# Patient Record
Sex: FEMALE | Race: BLACK OR AFRICAN AMERICAN | NOT HISPANIC OR LATINO | Employment: FULL TIME | URBAN - METROPOLITAN AREA
[De-identification: names, ages, dates, MRNs, and addresses within clinical notes are randomized per-mention and may not be internally consistent; named-entity substitution may affect disease eponyms.]

---

## 2023-09-15 ENCOUNTER — PATIENT OUTREACH (OUTPATIENT)
Dept: FAMILY MEDICINE CLINIC | Facility: CLINIC | Age: 28
End: 2023-09-15

## 2023-09-15 ENCOUNTER — OFFICE VISIT (OUTPATIENT)
Dept: FAMILY MEDICINE CLINIC | Facility: CLINIC | Age: 28
End: 2023-09-15
Payer: COMMERCIAL

## 2023-09-15 ENCOUNTER — APPOINTMENT (OUTPATIENT)
Dept: LAB | Facility: CLINIC | Age: 28
End: 2023-09-15
Payer: COMMERCIAL

## 2023-09-15 VITALS
SYSTOLIC BLOOD PRESSURE: 110 MMHG | WEIGHT: 165.8 LBS | TEMPERATURE: 98.1 F | OXYGEN SATURATION: 98 % | DIASTOLIC BLOOD PRESSURE: 72 MMHG | HEIGHT: 63 IN | HEART RATE: 79 BPM | BODY MASS INDEX: 29.38 KG/M2

## 2023-09-15 DIAGNOSIS — Z11.59 NEED FOR HEPATITIS C SCREENING TEST: ICD-10-CM

## 2023-09-15 DIAGNOSIS — Z11.4 SCREENING FOR HIV (HUMAN IMMUNODEFICIENCY VIRUS): ICD-10-CM

## 2023-09-15 DIAGNOSIS — E66.3 OVERWEIGHT: ICD-10-CM

## 2023-09-15 DIAGNOSIS — Z59.819 HOUSING INSECURITY: ICD-10-CM

## 2023-09-15 DIAGNOSIS — Z59.819 HOUSING INSECURITY: Primary | ICD-10-CM

## 2023-09-15 DIAGNOSIS — Z59.41 FOOD INSECURITY: ICD-10-CM

## 2023-09-15 DIAGNOSIS — Z00.00 ANNUAL PHYSICAL EXAM: Primary | ICD-10-CM

## 2023-09-15 DIAGNOSIS — R10.9 ABDOMINAL WALL PAIN: ICD-10-CM

## 2023-09-15 LAB
25(OH)D3 SERPL-MCNC: 19.7 NG/ML (ref 30–100)
BASOPHILS # BLD AUTO: 0.03 THOUSANDS/ÂΜL (ref 0–0.1)
BASOPHILS NFR BLD AUTO: 0 % (ref 0–1)
EOSINOPHIL # BLD AUTO: 0.04 THOUSAND/ÂΜL (ref 0–0.61)
EOSINOPHIL NFR BLD AUTO: 1 % (ref 0–6)
ERYTHROCYTE [DISTWIDTH] IN BLOOD BY AUTOMATED COUNT: 14.8 % (ref 11.6–15.1)
HCT VFR BLD AUTO: 39.8 % (ref 34.8–46.1)
HGB BLD-MCNC: 13.1 G/DL (ref 11.5–15.4)
IMM GRANULOCYTES # BLD AUTO: 0.02 THOUSAND/UL (ref 0–0.2)
IMM GRANULOCYTES NFR BLD AUTO: 0 % (ref 0–2)
LYMPHOCYTES # BLD AUTO: 2.49 THOUSANDS/ÂΜL (ref 0.6–4.47)
LYMPHOCYTES NFR BLD AUTO: 31 % (ref 14–44)
MCH RBC QN AUTO: 31.4 PG (ref 26.8–34.3)
MCHC RBC AUTO-ENTMCNC: 32.9 G/DL (ref 31.4–37.4)
MCV RBC AUTO: 95 FL (ref 82–98)
MONOCYTES # BLD AUTO: 0.35 THOUSAND/ÂΜL (ref 0.17–1.22)
MONOCYTES NFR BLD AUTO: 4 % (ref 4–12)
NEUTROPHILS # BLD AUTO: 5.1 THOUSANDS/ÂΜL (ref 1.85–7.62)
NEUTS SEG NFR BLD AUTO: 64 % (ref 43–75)
NRBC BLD AUTO-RTO: 0 /100 WBCS
PLATELET # BLD AUTO: 316 THOUSANDS/UL (ref 149–390)
PMV BLD AUTO: 10.1 FL (ref 8.9–12.7)
RBC # BLD AUTO: 4.17 MILLION/UL (ref 3.81–5.12)
TSH SERPL DL<=0.05 MIU/L-ACNC: 0.57 UIU/ML (ref 0.45–4.5)
VIT B12 SERPL-MCNC: 249 PG/ML (ref 180–914)
WBC # BLD AUTO: 8.03 THOUSAND/UL (ref 4.31–10.16)

## 2023-09-15 PROCEDURE — 86803 HEPATITIS C AB TEST: CPT

## 2023-09-15 PROCEDURE — 82306 VITAMIN D 25 HYDROXY: CPT

## 2023-09-15 PROCEDURE — 82607 VITAMIN B-12: CPT

## 2023-09-15 PROCEDURE — 87389 HIV-1 AG W/HIV-1&-2 AB AG IA: CPT

## 2023-09-15 PROCEDURE — 85025 COMPLETE CBC W/AUTO DIFF WBC: CPT

## 2023-09-15 PROCEDURE — 36415 COLL VENOUS BLD VENIPUNCTURE: CPT

## 2023-09-15 PROCEDURE — 99385 PREV VISIT NEW AGE 18-39: CPT | Performed by: FAMILY MEDICINE

## 2023-09-15 PROCEDURE — 84443 ASSAY THYROID STIM HORMONE: CPT

## 2023-09-15 RX ORDER — NAPROXEN SODIUM 220 MG
220 TABLET ORAL 2 TIMES DAILY PRN
Qty: 30 TABLET | Refills: 0 | Status: SHIPPED | OUTPATIENT
Start: 2023-09-15

## 2023-09-15 RX ORDER — CITALOPRAM HYDROBROMIDE 10 MG/1
10 TABLET ORAL DAILY
Qty: 30 TABLET | Refills: 2 | Status: SHIPPED | OUTPATIENT
Start: 2023-09-15

## 2023-09-15 SDOH — ECONOMIC STABILITY - HOUSING INSECURITY: HOUSING INSTABILITY UNSPECIFIED: Z59.819

## 2023-09-15 SDOH — ECONOMIC STABILITY - FOOD INSECURITY: FOOD INSECURITY: Z59.41

## 2023-09-15 NOTE — PATIENT INSTRUCTIONS
Please get your blood work completed before our next visit   Call a few locations for therapy and set up an appointment for within 1-4 weeks   Start Celexa 10 mg daily for depression

## 2023-09-15 NOTE — PROGRESS NOTES
Name: Antonio Espinal      : 1995      MRN: [id number]  Encounter Provider: Deisy Pelayo MD  Encounter Date: 9/15/2023   Encounter department: [address] [place] [address]     1. Annual physical exam  Comments: All screenings reviewed, in need of pap smear. will discuss at later visit    2. Post partum depression  Assessment & Plan:  [place] screen  2 months postpartum  Daughter is what brings her ag, otherwise finds herself crying daily, and has little interest in things that used to bring her interest.  Open to behavioral health therapy, social work went into her room after visit to discuss options  We will start Celexa 10 mg daily, and increase as tolerated. Advised close follow-up initially  Side effects and risks to medication discussed, is appropriate for breast-feeding as benefits outweigh the risk. Denies any suicidal ideation, self-harm, homicidal ideation, or thoughts of harm to baby. Orders:  -     TSH, 3rd generation with Free T4 reflex; Future  -     CBC and differential; Future  -     Vitamin B12; Future  -     Vitamin D 25 hydroxy; Future  -     citalopram (CeleXA) 10 mg tablet; Take 1 tablet (10 mg total) by mouth daily    3. Housing insecurity  -     Ambulatory Referral to [address] [place] [address]; Future    4. Food insecurity  -     Ambulatory Referral to Social Work Care Management Program; Future    5. Need for hepatitis C screening test  -     Hepatitis C Antibody; Future    6. Screening for HIV (human immunodeficiency virus)  -     HIV 1/2 AG/AB w Reflex [facility] for 2 yr old and above; Future    7. Overweight  -     Lipid Panel with Direct LDL reflex; Future  -     Comprehensive metabolic panel; Future    8. Abdominal wall pain  Comments:  2/2  recovery, appears well healed and no pain on palpation. Reports intermittent tightness/pain. Advised nsaids prn, can continue using her binder.    Orders:  -     naproxen sodium (ALEVE) 220 MG tablet; Take 1 tablet (220 mg total) by mouth 2 (two) times a day as needed for mild pain Take with food         Subjective      This is a very pleasant 34 y.o. female who presents to the clinic as a new patient to establish care. She has no known past medical history and takes no medications. She is concerned for post partum depression. Is about 2 months post partum of her second child delivered full term via c section. She is not sleeping well and has no appetite. She has lack of interest in things previously bringing her pleasure. 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 Sister helps with . She has a four year old son that lives with her as well. She is living with her sister as she recently lost her housing in [place] where her daughter was born. This was due to separation from daughters father and financially not able to afford housing on her own. She does have trouble with access to food as well. Tearful during visit and feels overwhelmed. Patient's medical conditions are stable unless noted otherwise above. Patient has not had any recent hospitalizations, or medical emergencies since last visit. Patient has no further complaints other than what is mentioned in the ROS. PHQ-2/9 Depression Screening    Little interest or pleasure in doing things: 2 - more than half the days  Feeling down, depressed, or hopeless: 0 - not at all  Trouble falling or staying asleep, or sleeping too much: 0 - not at all  Feeling tired or having little energy: 0 - not at all  Poor appetite or overeatin - several days  Feeling bad about yourself - or that you are a failure or have let yourself or your family down: 1 - several days  Trouble concentrating on things, such as reading the newspaper or watching television: 0 - not at all  Moving or speaking so slowly that other people could have noticed.  Or the opposite - being so fidgety or restless that you have been moving around a lot more than usual: 0 - not at all  Thoughts that you would be better off dead, or of hurting yourself in some way: 0 - not at all  PHQ-2 Score: 2  PHQ-2 Interpretation: Negative depression screen  PHQ-9 Score: 4   PHQ-9 Interpretation: No or Minimal depression        SUREKHA-7 Flowsheet Screening    Flowsheet Row Most Recent Value   Over the last 2 weeks, how often have you been bothered by any of the following problems? Feeling nervous, anxious, or on edge 1   Not being able to stop or control worrying 1   Worrying too much about different things 2   Trouble relaxing 1   Being so restless that it is hard to sit still 0   Becoming easily annoyed or irritable 0   Feeling afraid as if something awful might happen 0   SUREKHA-7 Total Score 5        [place] Depression Scale Score is 17/30     Review of Systems   Constitutional: Positive for fatigue. Negative for activity change, appetite change, chills and fever. HENT: Negative for congestion, dental problem, rhinorrhea, sinus pain and sore throat. Eyes: Negative for visual disturbance. Respiratory: Negative for cough, chest tightness, shortness of breath and wheezing. Cardiovascular: Negative for chest pain, palpitations and leg swelling. Gastrointestinal: Positive for abdominal pain. Negative for abdominal distention, anal bleeding, blood in stool, constipation, diarrhea, nausea and vomiting. Genitourinary: Negative for difficulty urinating, dysuria, frequency, hematuria and urgency. Musculoskeletal: Negative for arthralgias, back pain, myalgias and neck pain. Skin: Negative for rash. Allergic/Immunologic: Negative. Neurological: Negative for dizziness, weakness, light-headedness, numbness and headaches. Hematological: Negative for adenopathy. Psychiatric/Behavioral: Positive for dysphoric mood and sleep disturbance.  Negative for agitation, behavioral problems, confusion, decreased concentration, self-injury and suicidal ideas. The patient is nervous/anxious. No current outpatient medications on file prior to visit. Objective     /72 (BP Location: Left arm, Patient Position: Sitting, Cuff Size: Adult)   Pulse 79   Temp 98.1 °F (36.7 °C) (Tympanic)   Ht 5' 3" (1.6 m)   Wt 75.2 kg (165 lb 12.8 oz)   SpO2 98%   BMI 29.37 kg/m²     Physical Exam  Vitals and nursing note reviewed. Constitutional:       Appearance: She is well-developed. HENT:      Head: Normocephalic and atraumatic. Eyes:      Pupils: Pupils are equal, round, and reactive to light. Cardiovascular:      Rate and Rhythm: Normal rate and regular rhythm. Heart sounds: Normal heart sounds. No murmur heard. Pulmonary:      Effort: Pulmonary effort is normal. No respiratory distress. Breath sounds: Normal breath sounds. No wheezing. Chest:      Chest wall: No tenderness. Abdominal:      General: Bowel sounds are normal. There is no distension. Palpations: Abdomen is soft. Tenderness: There is no abdominal tenderness. Comments: Well healed low transverse scar from delivery   Musculoskeletal:         General: No tenderness. Normal range of motion. Cervical back: Normal range of motion and neck supple. Skin:     General: Skin is warm and dry. Capillary Refill: Capillary refill takes less than 2 seconds. Neurological:      Mental Status: She is alert and oriented to person, place, and time. Psychiatric:         Attention and Perception: Attention normal.         Mood and Affect: Mood is depressed. Mood is not anxious. Affect is tearful. Affect is not labile or flat. Speech: Speech normal.         Behavior: Behavior normal. Behavior is cooperative. Thought Content: Thought content normal. Thought content is not paranoid or delusional. Thought content does not include homicidal or suicidal ideation. Thought content does not include homicidal or suicidal plan. Bry Coker MD

## 2023-09-15 NOTE — PROGRESS NOTES
[facility] had received an in person referral from Idalmis Hernandez MD r/t PPD. Per provider, patient experiencing PPD with second child. Per provider, started patient on Celexa 10 mg for PPD. Per provider, patient recently moved to the area and lives with sister. [facility] had completed a chart review. Per chart, patient also referred for housing and food insecurity. [facility] had met with the patient after office visit. [facility] introduced herself and reason for consult. [facility] asked patient how she was doing. Patient stated she is doing well. [facility] educated patient on PPD. Patient stated she has dx of anxiety and depression. Patient stated she is interested in therapy but not psychiatry. [facility] had suggested [facility] [phone] as they offer therapy r/t her PPD, anxiety and depression. Patient given brochure for them. Patient stated she will establish care. Patient stated she lives with sister for the time being. Patient stated her sister is her main support. Patient stated it is her, her 3 y/o son and 8 week baby girl. Patient stated her son's father lives in [place] and calls to check in. Patient stated her daughter's father is not in the picture. Patient stated she is starting work in 3 weeks. Patient stated she will be working at [employer]. Patient did not report any transportation issues at this time. Patient stated she does need help with WIC and SNAP. [facility] offered to have her [facility] help with housing programs, rental assistance and SNAP application. Patient agreed. [facility] discussed [address] assistance through "[employer]. Patient asked [facility] to email her at [email or web address] and [email or web address]. [facility] agreed to do so. Patient stated she needed to get going as she was picking up her son at [address] [facility] provided her contact information. [facility] advised patient reach out if she has any other needs. Patient agreed. Patient consented to continued  outreach.  [facility] added patient to report under socially complex. [facility] sent email on [facility] assistance through [address]. [facility] also included the following r/t Formula Savings Program:    [employer]  [employer] Rewards  https://Itouzi.com/IncentOne    Enfamil  Family Beginnings Program  https://[email or web address]®.MyWants/    [facility] sent an in basket to [facility] about this case. [facility] will continue to f/u.

## 2023-09-17 LAB
HCV AB SER QL: NORMAL
HIV 1+2 AB+HIV1 P24 AG SERPL QL IA: NORMAL
HIV 2 AB SERPL QL IA: NORMAL
HIV1 AB SERPL QL IA: NORMAL
HIV1 P24 AG SERPL QL IA: NORMAL

## 2023-09-18 ENCOUNTER — APPOINTMENT (OUTPATIENT)
Dept: LAB | Facility: CLINIC | Age: 28
End: 2023-09-18
Payer: COMMERCIAL

## 2023-09-18 LAB
ALBUMIN SERPL BCP-MCNC: 4.1 G/DL (ref 3.5–5)
ALP SERPL-CCNC: 67 U/L (ref 34–104)
ALT SERPL W P-5'-P-CCNC: 32 U/L (ref 7–52)
ANION GAP SERPL CALCULATED.3IONS-SCNC: 8 MMOL/L
AST SERPL W P-5'-P-CCNC: 22 U/L (ref 13–39)
BILIRUB SERPL-MCNC: 0.45 MG/DL (ref 0.2–1)
BUN SERPL-MCNC: 9 MG/DL (ref 5–25)
CALCIUM SERPL-MCNC: 9.1 MG/DL (ref 8.4–10.2)
CHLORIDE SERPL-SCNC: 103 MMOL/L (ref 96–108)
CHOLEST SERPL-MCNC: 158 MG/DL
CO2 SERPL-SCNC: 27 MMOL/L (ref 21–32)
CREAT SERPL-MCNC: 0.81 MG/DL (ref 0.6–1.3)
GFR SERPL CREATININE-BSD FRML MDRD: 100 ML/MIN/1.73SQ M
GLUCOSE P FAST SERPL-MCNC: 86 MG/DL (ref 65–99)
HDLC SERPL-MCNC: 50 MG/DL
LDLC SERPL CALC-MCNC: 94 MG/DL (ref 0–100)
POTASSIUM SERPL-SCNC: 4.3 MMOL/L (ref 3.5–5.3)
PROT SERPL-MCNC: 7.3 G/DL (ref 6.4–8.4)
SODIUM SERPL-SCNC: 138 MMOL/L (ref 135–147)
TRIGL SERPL-MCNC: 70 MG/DL

## 2023-09-18 PROCEDURE — 80061 LIPID PANEL: CPT

## 2023-09-18 PROCEDURE — 80053 COMPREHEN METABOLIC PANEL: CPT

## 2023-09-18 PROCEDURE — 36415 COLL VENOUS BLD VENIPUNCTURE: CPT

## 2023-09-19 ENCOUNTER — ANNUAL EXAM (OUTPATIENT)
Dept: FAMILY MEDICINE CLINIC | Facility: CLINIC | Age: 28
End: 2023-09-19
Payer: COMMERCIAL

## 2023-09-19 ENCOUNTER — PATIENT OUTREACH (OUTPATIENT)
Dept: FAMILY MEDICINE CLINIC | Facility: CLINIC | Age: 28
End: 2023-09-19

## 2023-09-19 VITALS
BODY MASS INDEX: 28.9 KG/M2 | WEIGHT: 163.1 LBS | SYSTOLIC BLOOD PRESSURE: 126 MMHG | HEART RATE: 69 BPM | RESPIRATION RATE: 16 BRPM | DIASTOLIC BLOOD PRESSURE: 66 MMHG | OXYGEN SATURATION: 99 % | HEIGHT: 63 IN

## 2023-09-19 DIAGNOSIS — Z12.4 CERVICAL CANCER SCREENING: ICD-10-CM

## 2023-09-19 DIAGNOSIS — N89.8 VAGINAL DISCHARGE: Primary | ICD-10-CM

## 2023-09-19 DIAGNOSIS — Z98.891 PREVIOUS CESAREAN SECTION: ICD-10-CM

## 2023-09-19 DIAGNOSIS — E55.9 VITAMIN D DEFICIENCY: ICD-10-CM

## 2023-09-19 PROBLEM — Z01.419 WOMEN'S ANNUAL ROUTINE GYNECOLOGICAL EXAMINATION: Status: RESOLVED | Noted: 2023-09-19 | Resolved: 2023-09-19

## 2023-09-19 PROBLEM — Z01.419 WOMEN'S ANNUAL ROUTINE GYNECOLOGICAL EXAMINATION: Status: ACTIVE | Noted: 2023-09-19

## 2023-09-19 PROCEDURE — 87591 N.GONORRHOEAE DNA AMP PROB: CPT | Performed by: FAMILY MEDICINE

## 2023-09-19 PROCEDURE — G0145 SCR C/V CYTO,THINLAYER,RESCR: HCPCS | Performed by: FAMILY MEDICINE

## 2023-09-19 PROCEDURE — 87510 GARDNER VAG DNA DIR PROBE: CPT | Performed by: FAMILY MEDICINE

## 2023-09-19 PROCEDURE — 87660 TRICHOMONAS VAGIN DIR PROBE: CPT | Performed by: FAMILY MEDICINE

## 2023-09-19 PROCEDURE — 87480 CANDIDA DNA DIR PROBE: CPT | Performed by: FAMILY MEDICINE

## 2023-09-19 PROCEDURE — 87491 CHLMYD TRACH DNA AMP PROBE: CPT | Performed by: FAMILY MEDICINE

## 2023-09-19 PROCEDURE — 99214 OFFICE O/P EST MOD 30 MIN: CPT | Performed by: FAMILY MEDICINE

## 2023-09-19 RX ORDER — MELATONIN
1000 DAILY
Qty: 30 TABLET | Refills: 6 | Status: SHIPPED | OUTPATIENT
Start: 2023-09-19

## 2023-09-19 RX ORDER — METRONIDAZOLE 500 MG/1
500 TABLET ORAL EVERY 12 HOURS SCHEDULED
Qty: 14 TABLET | Refills: 0 | Status: SHIPPED | OUTPATIENT
Start: 2023-09-19 | End: 2023-09-26

## 2023-09-19 NOTE — PROGRESS NOTES
[address] [place] [address] called pt and left a message regarding assisting pt with getting snap benefits and housing. [address] [place] [address] then sent pt an email introducing self and asked if pt could give [address] [place] [address] a return call tomorrow.     Will F/U by 9/21
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
## 2023-09-19 NOTE — ASSESSMENT & PLAN NOTE
Advise to do modified exercises in gym rather than regular core exercise prior to pernancy as she is still only 2 mo pp  Encouraged to look up exercises for core that are safe for post partum mothers specifically who had c section

## 2023-09-19 NOTE — ASSESSMENT & PLAN NOTE
Discussed with patient that the USPSTF recommends screening for cervical cancer every 3 years with cervical cytology alone in women aged 24 to 34 years. Patient has agreed to undergo testing at this time. All questions were answered. STD screening discussed, HIV/Hep C negative   GC/Chlamydia ordered   Vaginosis probe ordered   Does not meet criteria for breast cancer screening at this time    Pap performed, patient tolerated well. Advised that she may experience some spotting afterwards which is normal and expected. Will call with results.

## 2023-09-21 ENCOUNTER — PATIENT OUTREACH (OUTPATIENT)
Dept: FAMILY MEDICINE CLINIC | Facility: CLINIC | Age: 28
End: 2023-09-21

## 2023-09-21 LAB
C TRACH DNA SPEC QL NAA+PROBE: NEGATIVE
CANDIDA RRNA VAG QL PROBE: NEGATIVE
G VAGINALIS RRNA GENITAL QL PROBE: NEGATIVE
N GONORRHOEA DNA SPEC QL NAA+PROBE: NEGATIVE
T VAGINALIS RRNA GENITAL QL PROBE: NEGATIVE

## 2023-09-21 NOTE — PROGRESS NOTES
[facility] emailed pt that [facility] had attempted to contact pt last week to see if pt was still interested in getting assistance with housing and  rental assistance? [facility] communicated , [facility] will have Ron send pt an application to complete for housing. Please feel free to give [facility] a call. [facility] will contact pt within a week to see if she received the housing application. and sent a send application request to pt home address through [employer] ([employer])     Will F/U by 9/28

## 2023-09-22 ENCOUNTER — PATIENT OUTREACH (OUTPATIENT)
Dept: FAMILY MEDICINE CLINIC | Facility: CLINIC | Age: 28
End: 2023-09-22

## 2023-09-22 NOTE — PROGRESS NOTES
ALEXX had called the patient via phone. ALEXX left a voicemail. KONRAD will attempt to call again at a later date and time. ALEXX routed note to [facility]. ALEXX will continue to f/u.

## 2023-09-23 LAB
LAB AP GYN PRIMARY INTERPRETATION: NORMAL
Lab: NORMAL

## 2023-09-28 ENCOUNTER — PATIENT OUTREACH (OUTPATIENT)
Dept: FAMILY MEDICINE CLINIC | Facility: CLINIC | Age: 28
End: 2023-09-28

## 2023-09-28 NOTE — LETTER
09/28/23    Dear Doris Sanchez,    I am a [facility] Worker with [address] [address]. I have made several attempts to call you by phone. It is important that you contact me back at [phone], so that I can assist with your care needs.      Sincerely,       GOPAL RecinosEd

## 2023-09-28 NOTE — PROGRESS NOTES
[address] [address] left pt a message to return call regarding if pt received [facility] application and sent pt unable to reach letter via pt [employer]. Will F/U within 2 weeks for pt correspondence.

## 2023-09-29 ENCOUNTER — PATIENT OUTREACH (OUTPATIENT)
Dept: FAMILY MEDICINE CLINIC | Facility: CLINIC | Age: 28
End: 2023-09-29

## 2023-09-29 NOTE — PROGRESS NOTES
ALEXX had called the patient via phone. ALEXX left a voicemail. KONRAD notes this is the second phone call attempt. [facility] notes Bartolo Choi has not been able to get in contact with this patient. [facility] will be mailing out an unable to reach letter today. KONRAD will await 2 weeks before closing the case. ALEXX routed note to [place]. ALEXX will continue to f/u.

## 2023-09-29 NOTE — LETTER
[address] [address]    Re: Bee Kerwin to reach   9/29/2023       Dear Kai Reyes,    I tried to reach you by phone and was unfortunately unable to reach you. It is important you be a part of your plan of care.  If you are still in need of assistance with services, please give me a call back at [phone] from 8am-4:30pm.    Sincerely,         LANA Richardson  Outpatient Care Manager -

## 2023-10-12 ENCOUNTER — PATIENT OUTREACH (OUTPATIENT)
Dept: FAMILY MEDICINE CLINIC | Facility: CLINIC | Age: 28
End: 2023-10-12

## 2023-10-12 ENCOUNTER — OFFICE VISIT (OUTPATIENT)
Dept: FAMILY MEDICINE CLINIC | Facility: CLINIC | Age: 28
End: 2023-10-12
Payer: COMMERCIAL

## 2023-10-12 VITALS
TEMPERATURE: 98.3 F | DIASTOLIC BLOOD PRESSURE: 64 MMHG | HEIGHT: 63 IN | SYSTOLIC BLOOD PRESSURE: 108 MMHG | BODY MASS INDEX: 28.7 KG/M2 | RESPIRATION RATE: 21 BRPM | OXYGEN SATURATION: 98 % | HEART RATE: 88 BPM | WEIGHT: 162 LBS

## 2023-10-12 DIAGNOSIS — Z30.9 ENCOUNTER FOR CONTRACEPTIVE MANAGEMENT, UNSPECIFIED TYPE: ICD-10-CM

## 2023-10-12 DIAGNOSIS — R10.2 RIGHT ADNEXAL TENDERNESS: ICD-10-CM

## 2023-10-12 PROCEDURE — 99213 OFFICE O/P EST LOW 20 MIN: CPT | Performed by: FAMILY MEDICINE

## 2023-10-12 RX ORDER — CITALOPRAM 20 MG/1
20 TABLET ORAL DAILY
Start: 2023-10-12

## 2023-10-12 NOTE — ASSESSMENT & PLAN NOTE
Noted on deep palpation in setting of  3 months ago  F/u transabdominal and transvaginal ultrasound on next appt

## 2023-10-12 NOTE — ASSESSMENT & PLAN NOTE
Per previous discussion with patient's GYN, she would like to get an IUD. Patient plans to transfer her GYN provider from [place] to [facility].   RTO for birth control counseling

## 2023-10-12 NOTE — PROGRESS NOTES
[facility] Office visit    Assessment/Plan:     1. Post partum depression  Assessment & Plan:  [place] score 15 today  3 months postpartum  Denied suicidal homicidal ideation or plans. Denies thoughts about hurting or killing her baby. Open to behavioral health therapy. Provided list of mental health providers in the area. Referral to social work sent. Increased Celexa to 20 mg at therapeutic dose daily starting today, plan to titrate up if mood is not controlled. Side effects and risks to medication discussed, is appropriate for breast-feeding as benefits outweigh the risk. Orders:  -     citalopram (CeleXA) 20 mg tablet; Take 1 tablet (20 mg total) by mouth daily  -     Ambulatory Referral to Social Work Care Management Program; Future    2. Right adnexal tenderness  Assessment & Plan:  Noted on deep palpation in setting of  3 months ago  F/u transabdominal and transvaginal ultrasound on next appt    Orders:  -     US abdomen and pelvis with transvaginal; Future; Expected date: 10/12/2023    3. Encounter for contraceptive management, unspecified type  Assessment & Plan:  Per previous discussion with patient's GYN, she would like to get an IUD. Patient plans to transfer her GYN provider from [place] to [facility]. RTO for birth control counseling         [facility] here . Subjective:   CHERYLE Mondragon is a 34 y.o. female presents today for follow-up postpartum depression. She was started on Celexa 10 mg last month, but she still feels that her mood is uncontrolled. Continuing to report poor appetite, sleep disturbances, and feeling depressed. She continues to breast-feed. 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 Her commute is about an hour each way. Patient reported feeling down after her first pregnancy, but this is the first time she has ever been on a mood stabilizer.   She is open to therapy. Patient reports lower abdominal tenderness that is worse with palpation. She had  3 months ago with her OB/GYN in [place]. LMP was last week. Patient states she is not getting menses every month for 2 months. No intermenstrual bleeding. Denies fever or chills. Patient is interested in an IUD. She was previously on Nexplanon for 3 years but reported having body odor as side effect. Review of Systems   Constitutional:  Negative for chills and fever. HENT:  Negative for ear pain and sore throat. Eyes:  Negative for pain and visual disturbance. Respiratory:  Negative for cough and shortness of breath. Cardiovascular:  Negative for chest pain and palpitations. Gastrointestinal:  Positive for abdominal pain. Negative for vomiting. Genitourinary:  Negative for dysuria and hematuria. Musculoskeletal:  Negative for arthralgias and back pain. Skin:  Negative for color change and rash. Neurological:  Negative for seizures and syncope. Psychiatric/Behavioral:  Positive for dysphoric mood. All other systems reviewed and are negative. Objective:     /64 (BP Location: Left arm, Patient Position: Sitting, Cuff Size: Large)   Pulse 88   Temp 98.3 °F (36.8 °C) (Temporal)   Resp 21   Ht 5' 3" (1.6 m)   Wt 73.5 kg (162 lb)   LMP 10/02/2023 (Approximate)   SpO2 98%   Breastfeeding Yes   BMI 28.70 kg/m²      Physical Exam  Vitals reviewed. Constitutional:       General: She is not in acute distress. Appearance: Normal appearance. HENT:      Head: Normocephalic and atraumatic. Eyes:      Pupils: Pupils are equal, round, and reactive to light. Cardiovascular:      Rate and Rhythm: Normal rate and regular rhythm. Pulses: Normal pulses. Heart sounds: Normal heart sounds. No murmur heard. Pulmonary:      Effort: Pulmonary effort is normal. No respiratory distress. Breath sounds: Normal breath sounds. No wheezing.    Abdominal: General: Bowel sounds are normal.      Palpations: Abdomen is soft. Tenderness: There is abdominal tenderness (lower abd, worse in right adenexal region). There is no guarding or rebound. Skin:     General: Skin is warm and dry. Comments: C section scar healed well   Neurological:      General: No focal deficit present. Mental Status: She is alert and oriented to person, place, and time.    Psychiatric:         Mood and Affect: Mood normal.         Behavior: Behavior normal.          ** Please Note: This note has been constructed using a voice recognition system **     [address] Suresh MD  10/12/23  6:31 PM

## 2023-10-12 NOTE — PATIENT INSTRUCTIONS
Outpatient [facility] Suicide and Crisis [id number] (call or text)   Crisis Text Line Text HOME to [phone] or Message on Pop[employer] ([address])   [place] [phone]   [address] [place] [address] [phone] (call or text)   Jeremiah Munoz [phone]     [facility] for Counseling and Psychotherapy Services  Address: [address] [facility] 101 [facility], [facility]  Phone: [phone]; Children and adults of all ages for counseling; In-person and/or telehealth; Accepts Medicare and out of network benefits    [address]  Address: [address] [facility] [address] [facility], [address] [place] [address] 64 [facility]  Phone: [phone]  [place]; Ages 13+ for psychotherapy, medication management and evaluation; Accepts Cigna, Horizon BC BS, Humana Inc, [employer], Aetna, and [employer] [facility]  Address: [address] [place] [address], [address] [facility], [address] [place] [address]  Phone: [phone]  English; Ages 5+ for individual, family, and couples counseling; In-person and/or telehealth services; Accepts Horizon BCBS, [place], Yolis [employer]itan, and [employer]  Address: [address] [place], [place], [address]  Phone: [phone]  Bilingual; Ages 4+ for family, individual, play therapy, psychiatric evaluations, and medication monitoring; Accepts Medicaid and Medicare    [facility]  Address: [facility], [address] [facility]  Phone:  [phone]  Bilingual; Ages 4+ for counseling and 18+ for psychiatry and med management; [address] for [facility] [employer] [facility]  Address: [address] [place] [address]  Phone: [phone]  Bilingual; Ages 5+ for counseling and psychiatry; Accept Medicare, Medicaid, and some commercial insurances  [facility]  Various locations  Phone: [phone] Ext.  [phone]  Bilingual; Ages 3+ for counseling, psychiatry, and medication management; Accepts ONLY Medicaid    [facility]  Address: [address]  Phone: [phone]; Ages 6+ for counseling; Accepts Medicare, Medicaid, and some commercial insurances      [facility]  Address: [address] [facility] [address], Wilian [place], [address] [facility]  Phone: [phone]  Bilingual; Ages 3+ for counseling; In-person and/or telehealth; Accept [facility] Lyudmila, [employer], [employer]ines, Optum, [facility], [place], Seven [place], [place] and self-pay    LANA Souza [facility]  Address: [address] [facility] [address]  Phone: [phone]  English; Ages 18+ for counseling; Accepts Medicare, Medicaid, and some commercial insurances    [address]  Address: [address] [facility], [address] [place] [address]  Phone: [phone]  English; Ages 18+ for counseling; Accepts Medicare, Medicaid, and some commercial insurances    LANA Calderon, [facility]  Address: [address]  Phone: [phone]  [address] [place] [address] Street; Ages 10+ for counseling; Accepts Medicare, Medicaid, and some [address]  Address: [address] [facility], [address] [place] [address],[address] [address]  Phone: [phone]  English; Ages 6+ for counseling, psychiatry, and medication management; ONLY telehealth; Accepts Medicaid, Amerigroup, Rudine Rides, [facility], and Omeroe Loren (Medication Management Only)    [facility]  Address: [address], [address] [place] [address]  Phone: [phone]  English; Ages 5+ for psychiatric evaluation and medication management; Accepts self-pay, Cigna, [employer] (HMO & PPO), Most BC/BS plans, Saint augustine, Two [facility] and [address] [facility]  Address: [address]  Phone: [phone]  English; Ages 10+ for counseling;  Accepts Medicare and some [address] [facility] 1600 Psychotherapy  Address: [address] [place] [address] [address]  Phone: [phone]  Bilingual; Ages 3+ for counseling, psychiatry, and medication management; Accepts Medicaid, Medicare, and [address] [place] [address]  Address: [address]  Phone: [phone]  Bilingual; Ages 5+ for counseling and psychiatry; Accepts Medicaid insurance; Offer medication management    [address] [facility]  Address: [address] [facility] [address]  Phone: [phone]  English; Children and adults of all ages for counseling, psychiatric evaluation and medication management; Accepts Medicare and some commercial plans    [facility]  Address: [address]  Intake Number: [phone]  Phone: [phone]  Bilingual; Ages 5+ for counseling, psychiatry, and medication management; Does NOT accept Medicaid; Offers therapy, psychiatry, and medication management    [facility]  Address: [facility], [address] [place] [address]  Phone: [phone]  Bilingual; Only ages 11 to 24 for behavioral health, intellectual/developmental disability, or substance use; Accepts Medicaid, Medicare, and some commercial plans    [facility])  Address: [address]  Phone: [phone]  English; Ages 2-20 children for counseling; Accepts ONLY Medicaid    [address] [facility]  Address: [address] [place] [address]  Phone: [phone]  English; Ages 4+ for counseling; Accepts Medicaid and some commercial insurances    For any additional questions or concerns, please contact the [address] [place] [address] or the office for further assistance.   [facility],  [facility] [phone]

## 2023-10-12 NOTE — PROGRESS NOTES
Ayde Hermosillo sent a inbasket to [address] [place] [address] that Ayde Hermosillo will be closing pt for [facility] health services since there has been no response to unable to reach letter from 9/28 by Ayde Hermosillo. Ayde Hermosillo reviewd pt chart and saw that [facility] had sent pt an unable to reach letter as well on 9/29.

## 2023-10-12 NOTE — ASSESSMENT & PLAN NOTE
[place] score 15 today  3 months postpartum  Denied suicidal homicidal ideation or plans. Denies thoughts about hurting or killing her baby. Open to behavioral health therapy. Provided list of mental health providers in the area. Referral to social work sent. Increased Celexa to 20 mg at therapeutic dose daily starting today, plan to titrate up if mood is not controlled. Side effects and risks to medication discussed, is appropriate for breast-feeding as benefits outweigh the risk.

## 2023-10-13 ENCOUNTER — PATIENT OUTREACH (OUTPATIENT)
Dept: FAMILY MEDICINE CLINIC | Facility: CLINIC | Age: 28
End: 2023-10-13

## 2023-10-13 NOTE — PROGRESS NOTES
[facility] had completed a chart review. [facility] notes patient has not called back since sending out unable to reach letter. [facility] notes Jessica Bonilla closed case yesterday due to lack of communication from patient. [facility] will do the same today. Patient has resources to f/u on her own. Please reconsult.

## 2023-11-18 PROBLEM — Z12.4 CERVICAL CANCER SCREENING: Status: RESOLVED | Noted: 2023-09-19 | Resolved: 2023-11-18

## 2024-01-25 ENCOUNTER — OFFICE VISIT (OUTPATIENT)
Dept: FAMILY MEDICINE CLINIC | Facility: CLINIC | Age: 29
End: 2024-01-25
Payer: COMMERCIAL

## 2024-01-25 VITALS
HEIGHT: 63 IN | WEIGHT: 153 LBS | SYSTOLIC BLOOD PRESSURE: 188 MMHG | RESPIRATION RATE: 18 BRPM | HEART RATE: 76 BPM | TEMPERATURE: 98.6 F | DIASTOLIC BLOOD PRESSURE: 70 MMHG | OXYGEN SATURATION: 99 % | BODY MASS INDEX: 27.11 KG/M2

## 2024-01-25 DIAGNOSIS — Z30.09 BIRTH CONTROL COUNSELING: Primary | ICD-10-CM

## 2024-01-25 PROCEDURE — 99213 OFFICE O/P EST LOW 20 MIN: CPT | Performed by: OBSTETRICS & GYNECOLOGY

## 2024-01-25 RX ORDER — CITALOPRAM 20 MG/1
20 TABLET ORAL DAILY
Qty: 30 TABLET | Refills: 2 | Status: SHIPPED | OUTPATIENT
Start: 2024-01-25
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
## 2024-01-25 NOTE — ASSESSMENT & PLAN NOTE
Education given regarding options for contraception, including barrier methods, injectable contraception, IUD placement, oral contraceptives, menopause, male partner vasectomy, Nexplanon, patches, and vaginal rings. Pt has used Nexplanon priorly.     Pt opted for Nexplanon   Advise condom use for protection against STDs  Will schedule visit for Nexplanon placement   Advise patient to abstain  from sex for 1-2 weeks prior to Nexplanon insertion

## 2024-02-27 NOTE — ASSESSMENT & PLAN NOTE
PPD has improved since restarting Celexa. Refers improvement of symptoms. Non suicidal. Non homicidal.      Continue Celexa 20 mg, PO, QD   RTO in 1-2 months for follow up on behavioral health

## 2024-02-27 NOTE — PROGRESS NOTES
[facility] - Outpatient Clinic  Outpatient Visit - FRANCIS: 24     Patient's Information      Name: Stella Miramontes  Age/Sex: 28 y.o. female  MRN: [id number]  : 1995      Assessment/Plan     A/P: Stella Miramontes is a 28 y.o. female patient that came to the clinic today for MDD.   Chart reviewed. Plan below.     Post partum depression    PPD has improved since restarting Celexa. Refers improvement of symptoms. Non suicidal. Non homicidal.      Continue Celexa 20 mg, PO, QD   RTO in 1-2 months for follow up on behavioral health     BV (bacterial vaginosis)    Pt complaining of burning and discomfort in her genital area. Also stated discharge and some fishy odor. Sexually active with one male. He does not use condoms when they have intercourse. Watery, whitish discharge noted on speculum examination.     Sent Metronidazole 500 mg, PO, BID for 7 days  Sent STD testing: G/C, HIV and Syphilis - Follow up  POCT UA sent for assessment of possible UTI in addition to BV   Urine culture sent    Associated orders were discussed and explained to the pt. Pertinent care gaps were addressed.   Pt voiced understanding and acceptance with A/P. Pt will call the office if any further questions/concerns.     Next Visit: Return in about 8 weeks (around 2024) for follow up on depression.     A/P of patient's case was discussed with the Attending, Dr. Isabel Faria.    Subjective     History of Present Illness      Chief Complaint   Patient presents with    Follow-up     Would also like to discuss Hiv/std testing aand vaginal discharge.     28 y.o. female patient came to the clinic for follow up on MDD. Doing better. Doing good on the meds. Her house is still being fixed and she should moving back in 2-3 months. She is doing well at her home with her children. Pt also has complaints of discharge and vaginal discomfort for the past 2-3 days. Also refers burning with  "peeing, since 2 - 3 days. She is sexually active. About 1-2 wk ago was her last sexual encounter with a male partner. She has been with the same partner on/off. Patient has sexual intercourse with pull out method and no condoms. Unsure if he deposits semen inside. Refers sometimes smelling a fishy odor coming from her genitals.     I reviewed patient's hx and updated as appropriate if needed: allergies, current medications, PMHx, FHx, social hx, surgical hx, and problem list.      Objective     Vital Signs     Visit Vitals  /70 (BP Location: Left arm, Patient Position: Sitting, Cuff Size: Standard)   Pulse 78   Temp 98.2 °F (36.8 °C) (Tympanic)   Ht 5' 3\" (1.6 m)   Wt 72.8 kg (160 lb 8 oz)   SpO2 100%   BMI 28.43 kg/m²   OB Status Unknown   Smoking Status Never   BSA 1.76 m²      Physical Exam      Exam conducted with a chaperone present.     Constitutional:       General: She is not in acute distress.     Appearance: Normal appearance. She is not ill-appearing or toxic-appearing.   HENT:      Head: Normocephalic and atraumatic.      Right Ear: External ear normal.      Left Ear: External ear normal.      Nose: Nose normal.      Mouth/Throat:      Mouth: Mucous membranes are moist.   Eyes:      General: No scleral icterus.     Conjunctiva/sclera: Conjunctivae normal.   Cardiovascular:      Rate and Rhythm: Normal rate.      Pulses: Normal pulses.   Pulmonary:      Effort: Pulmonary effort is normal. No respiratory distress.   Abdominal:      General: There is no distension.      Palpations: Abdomen is soft.      Tenderness: There is no abdominal tenderness.      Hernia: There is no hernia in the left inguinal area or right inguinal area.   Genitourinary:     General: Normal vulva.      Pubic Area: No rash or pubic lice.       Marvel stage (genital): 5.      Labia:         Right: No rash, tenderness, lesion or injury.         Left: No rash, tenderness, lesion or injury.       Urethra: No prolapse, urethral " "pain, urethral swelling or urethral lesion.      Vagina: No signs of injury and foreign body. Vaginal discharge and tenderness present. No erythema, bleeding, lesions or prolapsed vaginal walls.      Cervix: Normal. No cervical motion tenderness, discharge, friability, lesion or erythema.   Musculoskeletal:         General: Normal range of motion.      Cervical back: Normal range of motion.      Right lower leg: No edema.      Left lower leg: No edema.   Lymphadenopathy:      Lower Body: No right inguinal adenopathy. No left inguinal adenopathy.   Skin:     General: Skin is warm and dry.      Findings: No lesion or rash.   Neurological:      Mental Status: She is alert and oriented to person, place, and time.   Psychiatric:         Mood and Affect: Mood normal.         Behavior: Behavior normal.         Thought Content: Thought content normal.         Judgment: Judgment normal.          It was a pleasure being of service to Stella Miramontes. Thank you.     Jena Solis MD., MSMS.   [address]           Portions of the record may have been created with voice recognition software. Occasional wrong word or \"sound a like\" substitutions may have occurred due to the inherent limitations of voice recognition software. Read the chart carefully and recognize, using context, where substitutions have occurred.   "

## 2024-03-01 ENCOUNTER — OFFICE VISIT (OUTPATIENT)
Age: 29
End: 2024-03-01

## 2024-03-01 VITALS
HEART RATE: 78 BPM | BODY MASS INDEX: 28.44 KG/M2 | SYSTOLIC BLOOD PRESSURE: 114 MMHG | HEIGHT: 63 IN | TEMPERATURE: 98.2 F | OXYGEN SATURATION: 100 % | WEIGHT: 160.5 LBS | DIASTOLIC BLOOD PRESSURE: 70 MMHG

## 2024-03-01 DIAGNOSIS — B96.89 BV (BACTERIAL VAGINOSIS): ICD-10-CM

## 2024-03-01 DIAGNOSIS — Z20.2 POSSIBLE EXPOSURE TO STD: ICD-10-CM

## 2024-03-01 DIAGNOSIS — N76.0 BV (BACTERIAL VAGINOSIS): ICD-10-CM

## 2024-03-01 LAB
SL AMB  POCT GLUCOSE, UA: NORMAL
SL AMB LEUKOCYTE ESTERASE,UA: NORMAL
SL AMB POCT BILIRUBIN,UA: NORMAL
SL AMB POCT BLOOD,UA: NORMAL
SL AMB POCT KETONES,UA: NORMAL
SL AMB POCT NITRITE,UA: NORMAL
SL AMB POCT PH,UA: 6.5
SL AMB POCT SPECIFIC GRAVITY,UA: 1.02
SL AMB POCT URINE PROTEIN: NORMAL
SL AMB POCT UROBILINOGEN: NORMAL

## 2024-03-01 PROCEDURE — 99213 OFFICE O/P EST LOW 20 MIN: CPT | Performed by: FAMILY MEDICINE

## 2024-03-01 PROCEDURE — 81003 URINALYSIS AUTO W/O SCOPE: CPT | Performed by: FAMILY MEDICINE

## 2024-03-01 RX ORDER — METRONIDAZOLE 500 MG/1
500 TABLET ORAL EVERY 12 HOURS SCHEDULED
Qty: 14 TABLET | Refills: 0 | Status: SHIPPED | OUTPATIENT
Start: 2024-03-01 | End: 2024-03-08

## 2024-03-02 PROBLEM — N76.0 BV (BACTERIAL VAGINOSIS): Status: ACTIVE | Noted: 2024-03-02

## 2024-03-02 PROBLEM — Z20.2 POSSIBLE EXPOSURE TO STD: Status: ACTIVE | Noted: 2024-03-02

## 2024-03-02 PROBLEM — B96.89 BV (BACTERIAL VAGINOSIS): Status: ACTIVE | Noted: 2024-03-02

## 2024-03-02 NOTE — ASSESSMENT & PLAN NOTE
Pt complaining of burning and discomfort in her genital area. Also stated discharge and some fishy odor. Sexually active with one male. He does not use condoms when they have intercourse. Watery, whitish discharge noted on speculum examination.     Sent Metronidazole 500 mg, PO, BID for 7 days  Sent STD testing: G/C, HIV and Syphilis - Follow up  POCT UA sent for assessment of possible UTI in addition to BV   Urine culture sent

## 2024-03-05 ENCOUNTER — APPOINTMENT (OUTPATIENT)
Dept: LAB | Facility: CLINIC | Age: 29
End: 2024-03-05
Payer: COMMERCIAL

## 2024-03-05 DIAGNOSIS — Z20.2 POSSIBLE EXPOSURE TO STD: ICD-10-CM

## 2024-03-05 PROCEDURE — 36415 COLL VENOUS BLD VENIPUNCTURE: CPT

## 2024-03-05 PROCEDURE — 87389 HIV-1 AG W/HIV-1&-2 AB AG IA: CPT

## 2024-03-05 PROCEDURE — 86780 TREPONEMA PALLIDUM: CPT

## 2024-03-06 LAB — TREPONEMA PALLIDUM IGG+IGM AB [PRESENCE] IN SERUM OR PLASMA BY IMMUNOASSAY: NORMAL

## 2024-03-07 LAB — HIV 1+2 AB+HIV1 P24 AG SERPL QL IA: NON REACTIVE

## 2024-03-14 ENCOUNTER — PROCEDURE VISIT (OUTPATIENT)
Age: 29
End: 2024-03-14

## 2024-03-14 VITALS
BODY MASS INDEX: 28.33 KG/M2 | HEIGHT: 63 IN | SYSTOLIC BLOOD PRESSURE: 118 MMHG | TEMPERATURE: 99.1 F | OXYGEN SATURATION: 99 % | DIASTOLIC BLOOD PRESSURE: 62 MMHG | RESPIRATION RATE: 18 BRPM | HEART RATE: 74 BPM | WEIGHT: 159.9 LBS

## 2024-03-14 DIAGNOSIS — Z30.017 NEXPLANON INSERTION: Primary | ICD-10-CM

## 2024-03-14 LAB
EXT PREGNANCY TEST URINE: NEGATIVE
EXT. CONTROL: NORMAL

## 2024-03-14 PROCEDURE — 11981 INSERTION DRUG DLVR IMPLANT: CPT | Performed by: OBSTETRICS & GYNECOLOGY

## 2024-03-14 PROCEDURE — 99214 OFFICE O/P EST MOD 30 MIN: CPT | Performed by: OBSTETRICS & GYNECOLOGY

## 2024-03-14 NOTE — PROGRESS NOTES
"  Name: Stella Miramontes      : 1995      MRN: [id number]  Encounter Provider: Keke Phan MD  Encounter Date: 3/14/2024   Encounter department: [facility]    Assessment & Plan    1. Nexplanon insertion  The wrap can be removed tomorrow morning. Can be loosened if need be.  Explained patient to keep the band aid on for 2-3 days  Pt told to use tylenol if needed for pain  Return to the office in 1 week.    Subjective    HPI   Pt is a 29 yo female  w/pmh of ppd presents today for Nexplanon insertion. Pt's last delivery was 2023. Last period 2024 and it was 5-6 days long. Pt's last sexual intercourse was more than 1 month ago. Pt had Nexplanon before in 2019. Denies any nausea, vomiting, diarrhea, headache, chest pain, shortness of breathe.     Objective    /62 (BP Location: Left arm, Patient Position: Sitting, Cuff Size: Adult)   Pulse 74   Temp 99.1 °F (37.3 °C) (Tympanic)   Resp 18   Ht 5' 3\" (1.6 m)   Wt 72.5 kg (159 lb 14.4 oz)   LMP 2024 (Exact Date)   SpO2 99%   BMI 28.33 kg/m²      Procedures  Keke Phan MD    Universal Protocol:  Procedure performed by:  Consent: Verbal consent obtained. Written consent obtained.  Risks and benefits: risks, benefits and alternatives were discussed  Consent given by: patient  Patient understanding: patient states understanding of the procedure being performed  Patient consent: the patient's understanding of the procedure matches consent given  Procedure consent: procedure consent matches procedure scheduled  Relevant documents: relevant documents present and verified  Site marked: the operative site was marked  Radiology Images displayed and confirmed. If images not available, report reviewed: imaging studies not available  Patient identity confirmed: verbally with patient  Remove and insert drug implant    Date/Time: 3/14/2024 1:15 PM    Performed by: Keke Phan MD  Authorized " by: Keke Phan MD    Indication:     Indication: Insertion of non-biodegradable drug delivery implant    Pre-procedure:     Prepped with: chlorhexidine gluconate      Local anesthetic:  Lidocaine 1%    The site was cleaned and prepped in a sterile fashion: yes    Procedure:     Procedure:  Insertion    Small stab incision was made in arm: no      Left/right:  Left    Preloaded contraceptive capsule trocar was placed subdermally: yes      Visualization of implant was obtained: yes      Contraceptive capsule was inserted and trocar removed: yes      Visualization of notch in stylet and palpation of device: yes      Palpation confirms placement by provider and patient: yes      Site was closed with steri-strips and pressure bandage applied: yes